# Patient Record
Sex: FEMALE | URBAN - METROPOLITAN AREA
[De-identification: names, ages, dates, MRNs, and addresses within clinical notes are randomized per-mention and may not be internally consistent; named-entity substitution may affect disease eponyms.]

---

## 2023-01-23 ENCOUNTER — TRANSFERRED RECORDS (OUTPATIENT)
Dept: HEALTH INFORMATION MANAGEMENT | Facility: CLINIC | Age: 53
End: 2023-01-23
Payer: COMMERCIAL

## 2023-01-23 ENCOUNTER — MEDICAL CORRESPONDENCE (OUTPATIENT)
Dept: HEALTH INFORMATION MANAGEMENT | Facility: CLINIC | Age: 53
End: 2023-01-23
Payer: COMMERCIAL

## 2023-02-13 ENCOUNTER — OFFICE VISIT (OUTPATIENT)
Dept: EDUCATION SERVICES | Facility: CLINIC | Age: 53
End: 2023-02-13
Payer: COMMERCIAL

## 2023-02-13 DIAGNOSIS — E66.01 MORBID OBESITY (H): Primary | ICD-10-CM

## 2023-02-13 PROCEDURE — 97802 MEDICAL NUTRITION INDIV IN: CPT | Performed by: DIETITIAN, REGISTERED

## 2023-02-13 NOTE — PATIENT INSTRUCTIONS
[place], Tuna, baked, broiled    Fruit 2-3 servings/ day     Two good yogurt or [employer] triple zero (black one)    Walnuts chopped 1-2 tsp for yogurt     Big baggie of raw vegetables for work     1335 calories/ day         Goals:  Practice healthy stress management (mindful eating, think are you physically hungry or are you board, stressed, emotional etc.) make of list of things to do besides eat.    Try to get good quality sleep with a goal of 7-8 hours per night.  Stay physically active on a daily basis and throughout the year.  Recommend a fitness tracker; gradually increase the average to a minimum of 6000 steps with the ultimate goal of 10,000 steps per day.      Eat in a healthy way; follow the plate method.  Keep a food record ([employer]; [employer]).  Nutrition reference:  Eat 3 meals a day; snacks are optional.    A meal is 3 or more food groups; make it colorful for better nutrition.      Wegovy dosing   Dose Escalation Schedule  Weeks     Weekly Dose  1 through 4    0.25 mg  5 through 8    0.5 mg  9 through 12    1 mg  13 through 16   1.7 mg  Week 17 and onward  2.4 mg Maintenance dose    If patients do not tolerate a dose during dose escalation,  consider delaying dose escalation for 4 weeks.  The maintenance dose of WEGOVY  is 2.4 mg injected  subcutaneously once-weekly.  If patients do not tolerate the maintenance 2.4 mg once-weekly  dose, the dose can be temporarily decreased to 1.7 mg  once-weekly, for a maximum of 4 weeks. After 4 weeks,  increase WEGOVY  to the maintenance 2.4 mg once-weekly.  Discontinue WEGOVY  if the patient cannot tolerate the 2.4  mg dose.

## 2023-02-13 NOTE — PROGRESS NOTES
"    Medical Nutrition Therapy  Visit Type:Initial assessment and intervention    Sofya ZAFAR Asp presents today for MNT and education related to weight management for Obesity class 3 Body mass index is 45.32 kg/m . and HTN.   She is accompanied by self.     ASSESSMENT:   Patient comments/concerns relating to nutrition: Patient states she is in \"a rut\" but motivated to improve overall lifestyle and nutritional intake patient would like to also look medication interventions for weight control in the management of diabetes.  Patient does have both father and father with type 2 diabetes.    NUTRITION HISTORY:  Eating once or twice a day   Snacking on potato chips   Done WW and gained wt  Curves lowest was #200    Yogurt, blueberries, granola   More fruit in summer    Likes corn, green beans and broccoli, cauliflower     [employer] at work burger and fries     Salami or ham     Vitamin water     Previous diet education:  No     Food allergies/intolerances: none    Diet is high in: calories  Diet is low in: fruits and vegetables    EXERCISE: no regular exercise program    MEDICATIONS:  No current outpatient medications on file.     No current facility-administered medications for this visit.       LABS:  Last Basic Metabolic Panel:  No results found for: NA   No results found for: POTASSIUM  No results found for: CHLORIDE  No results found for: LORENZO  No results found for: CO2  No results found for: BUN  No results found for: CR  Lab Results   Component Value Date    GLC negative 07/17/2009       ANTHROPOMETRICS:  Vitals: Ht 1.647 m (5' 4.84\")   Wt 122.9 kg (271 lb)   BMI 45.32 kg/m    Body mass index is 45.32 kg/m .      Wt Readings from Last 5 Encounters:   02/13/23 122.9 kg (271 lb)           ESTIMATED KCAL REQUIREMENTS:  1335 kcal per day    NUTRITION DIAGNOSIS: Overweight/Obesity related to increased calorie intake above estimated needs as evidenced by Body mass index is 45.32 kg/m .    NUTRITION INTERVENTION:  Nutrition " Prescription: Energy Intake: 1335 kcal/day  Education given to support: general nutrition guidelines, weight reduction, exercise and portion control  Education Materials Provided: My Plate Planner/Choose My Plate, heart healthy guidelines and Fiber Facts     Discussed what dietary changes has worked well for her in the past and how to incorporate them again.  Discussion on healthy behavior changes that can promote calorie reduction in diet.    We discussed why it is important to incorporate healthy lifestyle interventions to control overall health to prevent complications of being obese including the potential of developing diabetes and preventing heart disease.      Hunger/ Fullness cues - help identify how pt is feeling before, during, and after eating   Smart Snacking and 20 Ways to eat more fruit and vegetables.    Mindful eating - listening to body vs eating out of stress, boredom, emotion, eating to fuel body for strength and energy   Tracking food - balance of meals and snacks   Importance of daily physical activity as able to promote endorphin release       reduce stress, anxiety, and depression, improve sleep, increase energy level, improve muscle tone and strength   Recommend: Discussed options for medications and reduced calorie dietary guidelines.      Pt would like to try Wegovy  Wegovy: proper use, mechanism of action, indications, dosing, injection schedule, safety and side effects.  Pt is shown a demonstration of the use of the pen and was able to return demonstration without difficulty.  Wegovy would be on a monthly dose escalation schedule.     Dose Escalation Schedule  Weeks                                 Weekly Dose  1 through 4              0.25 mg  5 through 8              0.5 mg  9 through 12                        1 mg  13 through 16                      1.7 mg  Week 17 and onward          2.4 mg Maintenance dose   If patients do not tolerate a dose during dose escalation,  consider delaying  dose escalation for 4 weeks.   The maintenance dose of WEGOVY  is 2.4 mg injected  subcutaneously once-weekly.   If patients do not tolerate the maintenance 2.4 mg once-weekly  dose, the dose can be temporarily decreased to 1.7 mg  once-weekly, for a maximum of 4 weeks. After 4 weeks,  increase WEGOVY  to the maintenance 2.4 mg once-weekly.  Discontinue WEGOVY  if the patient cannot tolerate the 2.4  mg dose.     PATIENT'S BEHAVIOR CHANGE GOALS:   See Patient Instructions for patient stated behavior change goals. AVS was printed and given to patient at today's appointment.    MONITOR / EVALUATE:  RD will monitor/evaluate:  Pertinent Labs  Readiness to change nutrition-related behaviors  Weight change    FOLLOW-UP:  Follow up with RD as needed.  Recommend follow-up in 4 months    Time spent in minutes: 45  Encounter: Individual

## 2023-02-13 NOTE — LETTER
February 15, 2023      Sofya ZAFAR Asp  [address] [address]        Hi Dr. Ledezma,    Pt would like to try Wegovy  Wegovy: proper use, mechanism of action, indications, dosing, injection schedule, safety and side effects.  Pt is shown a demonstration of the use of the pen and was able to return demonstration without difficulty.  Wegovy would be on a monthly dose escalation schedule.       Dose Escalation Schedule  Weeks                                 Weekly Dose  1 through 4                0.25 mg  5 through 8                0.5 mg  9 through 12                        1 mg  13 through 16                      1.7 mg  Week 17 and onward          2.4 mg Maintenance dose    WEGOVY weekly injection, 4 pens/ month   0.25 mg/0.5 mL   2 mL/ month  0.5 mg/0.5 mL   2 mL/ month  1 mg/0.5 mL    2 mL/ month  1.7 mg/0.75 mL   3 mL/ month  2.4 mg/0.75 mL   3 mL/ month       If patients do not tolerate a dose during dose escalation,  consider delaying dose escalation for 4 weeks.   The maintenance dose of WEGOVY  is 2.4 mg injected  subcutaneously once-weekly.   If patients do not tolerate the maintenance 2.4 mg once-weekly  dose, the dose can be temporarily decreased to 1.7 mg  once-weekly, for a maximum of 4 weeks. After 4 weeks,  increase WEGOVY  to the maintenance 2.4 mg once-weekly.  Discontinue WEGOVY  if the patient cannot tolerate the 2.4  mg dose.       Sincerely,        Neena Uriarte RD

## 2023-02-13 NOTE — LETTER
"    2/13/2023         RE: Sofya ZAFAR Asp  [address] [facility] [address] [place] [address]        Dear Colleague,    Thank you for referring your patient, Sofya ZAFAR Asp, to the [facility]. Please see a copy of my visit note below.        Medical Nutrition Therapy  Visit Type:Initial assessment and intervention    Sofya ZAFAR Asp presents today for MNT and education related to weight management for Obesity class 3 Body mass index is 45.32 kg/m . and HTN.   She is accompanied by self.     ASSESSMENT:   Patient comments/concerns relating to nutrition: Patient states she is in \"a rut\" but motivated to improve overall lifestyle and nutritional intake patient would like to also look medication interventions for weight control in the management of diabetes.  Patient does have both father and father with type 2 diabetes.    NUTRITION HISTORY:  Eating once or twice a day   Snacking on potato chips   Done WW and gained wt  Curves lowest was #200    Yogurt, blueberries, granola   More fruit in summer    Likes corn, green beans and broccoli, cauliflower     [employer] at work burger and fries     Salami or ham     Vitamin water     Previous diet education:  No     Food allergies/intolerances: none    Diet is high in: calories  Diet is low in: fruits and vegetables    EXERCISE: no regular exercise program    MEDICATIONS:  No current outpatient medications on file.     No current facility-administered medications for this visit.       LABS:  Last Basic Metabolic Panel:  No results found for: NA   No results found for: POTASSIUM  No results found for: CHLORIDE  No results found for: LORENZO  No results found for: CO2  No results found for: BUN  No results found for: CR  Lab Results   Component Value Date    GLC negative 07/17/2009       ANTHROPOMETRICS:  Vitals: Ht 1.647 m (5' 4.84\")   Wt 122.9 kg (271 lb)   BMI 45.32 kg/m    Body mass index is 45.32 kg/m .      Wt Readings from Last 5 Encounters:   02/13/23 122.9 kg (271 lb) "           ESTIMATED KCAL REQUIREMENTS:  1335 kcal per day    NUTRITION DIAGNOSIS: Overweight/Obesity related to increased calorie intake above estimated needs as evidenced by Body mass index is 45.32 kg/m .    NUTRITION INTERVENTION:  Nutrition Prescription: Energy Intake: 1335 kcal/day  Education given to support: general nutrition guidelines, weight reduction, exercise and portion control  Education Materials Provided: My Plate Planner/Choose My Plate, heart healthy guidelines and Fiber Facts     Discussed what dietary changes has worked well for her in the past and how to incorporate them again.  Discussion on healthy behavior changes that can promote calorie reduction in diet.    We discussed why it is important to incorporate healthy lifestyle interventions to control overall health to prevent complications of being obese including the potential of developing diabetes and preventing heart disease.      Hunger/ Fullness cues - help identify how pt is feeling before, during, and after eating   Smart Snacking and 20 Ways to eat more fruit and vegetables.    Mindful eating - listening to body vs eating out of stress, boredom, emotion, eating to fuel body for strength and energy   Tracking food - balance of meals and snacks   Importance of daily physical activity as able to promote endorphin release       reduce stress, anxiety, and depression, improve sleep, increase energy level, improve muscle tone and strength   Recommend: Discussed options for medications and reduced calorie dietary guidelines.      Pt would like to try Wegovy  Wegovy: proper use, mechanism of action, indications, dosing, injection schedule, safety and side effects.  Pt is shown a demonstration of the use of the pen and was able to return demonstration without difficulty.  Wegovy would be on a monthly dose escalation schedule.     Dose Escalation Schedule  Weeks                                 Weekly Dose  1 through 4              0.25 mg  5  through 8              0.5 mg  9 through 12                        1 mg  13 through 16                      1.7 mg  Week 17 and onward          2.4 mg Maintenance dose   If patients do not tolerate a dose during dose escalation,  consider delaying dose escalation for 4 weeks.   The maintenance dose of WEGOVY  is 2.4 mg injected  subcutaneously once-weekly.   If patients do not tolerate the maintenance 2.4 mg once-weekly  dose, the dose can be temporarily decreased to 1.7 mg  once-weekly, for a maximum of 4 weeks. After 4 weeks,  increase WEGOVY  to the maintenance 2.4 mg once-weekly.  Discontinue WEGOVY  if the patient cannot tolerate the 2.4  mg dose.     PATIENT'S BEHAVIOR CHANGE GOALS:   See Patient Instructions for patient stated behavior change goals. AVS was printed and given to patient at today's appointment.    MONITOR / EVALUATE:  RD will monitor/evaluate:  Pertinent Labs  Readiness to change nutrition-related behaviors  Weight change    FOLLOW-UP:  Follow up with RD as needed.  Recommend follow-up in 4 months    Time spent in minutes: 45  Encounter: Individual

## 2023-02-15 VITALS — WEIGHT: 271 LBS | HEIGHT: 65 IN | BODY MASS INDEX: 45.15 KG/M2
